# Patient Record
Sex: FEMALE | Race: WHITE | Employment: FULL TIME
[De-identification: names, ages, dates, MRNs, and addresses within clinical notes are randomized per-mention and may not be internally consistent; named-entity substitution may affect disease eponyms.]

---

## 2022-04-18 PROBLEM — E03.9 ACQUIRED HYPOTHYROIDISM: Status: ACTIVE | Noted: 2022-04-18

## 2022-04-18 PROBLEM — F17.200 CURRENT SMOKER: Status: ACTIVE | Noted: 2022-04-18

## 2022-04-18 PROBLEM — E66.9 OBESITY: Status: ACTIVE | Noted: 2022-04-18

## 2022-04-18 PROBLEM — R73.03 PREDIABETES: Status: ACTIVE | Noted: 2022-04-18

## 2022-05-31 ENCOUNTER — NURSE ONLY (OUTPATIENT)
Dept: FAMILY MEDICINE CLINIC | Facility: CLINIC | Age: 37
End: 2022-05-31

## 2022-05-31 DIAGNOSIS — E03.9 ACQUIRED HYPOTHYROIDISM: ICD-10-CM

## 2022-05-31 DIAGNOSIS — E03.9 ACQUIRED HYPOTHYROIDISM: Primary | ICD-10-CM

## 2022-05-31 LAB
T4 FREE SERPL-MCNC: 1 NG/DL (ref 0.9–1.8)
TSH, 3RD GENERATION: 13.3 UIU/ML (ref 0.36–3.74)

## 2022-06-15 ENCOUNTER — OFFICE VISIT (OUTPATIENT)
Dept: FAMILY MEDICINE CLINIC | Facility: CLINIC | Age: 37
End: 2022-06-15
Payer: COMMERCIAL

## 2022-06-15 VITALS
SYSTOLIC BLOOD PRESSURE: 120 MMHG | DIASTOLIC BLOOD PRESSURE: 78 MMHG | WEIGHT: 223 LBS | HEART RATE: 78 BPM | OXYGEN SATURATION: 98 %

## 2022-06-15 DIAGNOSIS — E03.9 ACQUIRED HYPOTHYROIDISM: Primary | ICD-10-CM

## 2022-06-15 DIAGNOSIS — R73.03 PREDIABETES: ICD-10-CM

## 2022-06-15 PROCEDURE — 99213 OFFICE O/P EST LOW 20 MIN: CPT | Performed by: FAMILY MEDICINE

## 2022-06-15 RX ORDER — LEVOTHYROXINE SODIUM 0.07 MG/1
75 TABLET ORAL DAILY
COMMUNITY
Start: 2022-04-18 | End: 2022-06-15 | Stop reason: ALTCHOICE

## 2022-06-15 RX ORDER — LEVOTHYROXINE SODIUM 88 UG/1
88 TABLET ORAL DAILY
Qty: 30 TABLET | Refills: 5 | Status: SHIPPED | OUTPATIENT
Start: 2022-06-15 | End: 2022-07-19

## 2022-06-15 ASSESSMENT — ENCOUNTER SYMPTOMS
CONSTIPATION: 0
COUGH: 0
SHORTNESS OF BREATH: 0
ABDOMINAL PAIN: 0
CHEST TIGHTNESS: 0
EYE DISCHARGE: 0
SINUS PAIN: 0
DIARRHEA: 0

## 2022-06-15 NOTE — PROGRESS NOTES
Edward Ibarra is a 60 [facility] y.o. female who presents with   Chief Complaint   Patient presents with   [facility]       History of Present Illness  Pt with trouble getting pregnant. Was discovered to be hypothyroid in March. Started on Synthroid 75mcg and Metformin. Sxs of weight gain and fatigue as well. FH thyroid disease. Has been able to lose weight, but TSH still up at 13. Amenorrhea since January except some light spotting. Mood low at times. Review of Systems  Review of Systems   Constitutional: Negative for appetite change, fatigue and fever. HENT: Negative for congestion, ear pain and sinus pain. Eyes: Negative for discharge. Respiratory: Negative for cough, chest tightness and shortness of breath. Cardiovascular: Negative for chest pain, palpitations and leg swelling. Gastrointestinal: Negative for abdominal pain, constipation and diarrhea. Genitourinary: Negative for dysuria. Musculoskeletal: Negative for joint swelling. Skin: Negative for rash. Neurological: Negative for headaches. Hematological: Negative for adenopathy. Psychiatric/Behavioral: Negative for dysphoric mood. The patient is not nervous/anxious. Medications  Current Outpatient Medications   Medication Sig Dispense Refill    metFORMIN (GLUCOPHAGE) 500 MG tablet Take 500 mg by mouth 2 times daily      levothyroxine (SYNTHROID) 88 MCG tablet Take 1 tablet by mouth daily 30 tablet 5     No current facility-administered medications for this visit. Past Medical History  History reviewed. No pertinent past medical history.     Surgical History  Past Surgical History:   Procedure Laterality Date    BREAST BIOPSY Left 04/2022    CHOLECYSTECTOMY      HEENT            Family History  Family History   Problem Relation Age of Onset    Thyroid Disease Maternal Aunt     Thyroid Disease Mother     Thyroid Disease Maternal Grandmother     Hypertension Paternal Grandmother        Social History  Social History     Socioeconomic History    Marital status: Single     Spouse name: Not on file    Number of children: Not on file    Years of education: Not on file    Highest education level: Not on file   Occupational History    Not on file   Tobacco Use    Smoking status: Current Every Day Smoker     Packs/day: 0.50    Smokeless tobacco: Never Used   Substance and Sexual Activity    Alcohol use: No    Drug use: No    Sexual activity: Not on file   Other Topics Concern    Not on file   Social History Narrative    Not on file     Social Determinants of Health     Financial Resource Strain:     Difficulty of Paying Living Expenses: Not on file   Food Insecurity:     Worried About Running Out of Food in the Last Year: Not on file    Que of Food in the Last Year: Not on file   Transportation Needs:     Lack of Transportation (Medical): Not on file    Lack of Transportation (Non-Medical):  Not on file   Physical Activity:     Days of Exercise per Week: Not on file    Minutes of Exercise per Session: Not on file   Stress:     Feeling of Stress : Not on file   Social Connections:     Frequency of Communication with Friends and Family: Not on file    Frequency of Social Gatherings with Friends and Family: Not on file    Attends Holiness Services: Not on file    Active Member of [address] [place] [address] [employer] or Organizations: Not on file    Attends Club or Organization Meetings: Not on file    Marital Status: Not on file   Intimate Partner Violence:     Fear of Current or Ex-Partner: Not on file    Emotionally Abused: Not on file    Physically Abused: Not on file    Sexually Abused: Not on file   Housing Stability:     Unable to Pay for Housing in the Last Year: Not on file    Number of [place] in the Last Year: Not on file    Unstable Housing in the Last Year: Not on file       Social History     Tobacco Use   Smoking Status Current Every Day Smoker    Packs/day: 0.50   Smokeless Tobacco Never Used Allergies  No Known Allergies    Vital Signs  There is no height or weight on file to calculate BMI. Vitals:    06/15/22 0759   BP: 120/78   Pulse: 78   SpO2: 98%   Weight: 223 lb (101.2 kg)       Physical Exam  Physical Exam  Vitals reviewed. Constitutional:       Appearance: Normal appearance. HENT:      Head: Normocephalic. Right Ear: Tympanic membrane normal.      Left Ear: Tympanic membrane normal.      Nose: No congestion. Mouth/Throat:      Pharynx: No oropharyngeal exudate or posterior oropharyngeal erythema. Eyes:      Extraocular Movements: Extraocular movements intact. Conjunctiva/sclera: Conjunctivae normal.   Cardiovascular:      Rate and Rhythm: Normal rate and regular rhythm. Heart sounds: Normal heart sounds. No murmur heard. Pulmonary:      Effort: Pulmonary effort is normal.      Breath sounds: Normal breath sounds. No wheezing. Musculoskeletal:         General: No tenderness. Right lower leg: No edema. Left lower leg: No edema. Lymphadenopathy:      Cervical: No cervical adenopathy. Skin:     General: Skin is warm and dry. Findings: No rash. Neurological:      General: No focal deficit present. Mental Status: She is alert. Psychiatric:         Mood and Affect: Mood normal.         Thought Content: Thought content normal.          Assessment and Plan  Nba Thomas was seen today for discuss labs. Diagnoses and all orders for this visit:    Acquired hypothyroidism    Prediabetes    Other orders  -     levothyroxine (SYNTHROID) 88 MCG tablet;  Take 1 tablet by mouth daily    Increase Synthroid  Recheck TSH in 6 weeks  Diet/exercise

## 2022-07-15 DIAGNOSIS — E03.9 ACQUIRED HYPOTHYROIDISM: Primary | ICD-10-CM

## 2022-07-18 ENCOUNTER — NURSE ONLY (OUTPATIENT)
Dept: FAMILY MEDICINE CLINIC | Facility: CLINIC | Age: 37
End: 2022-07-18

## 2022-07-18 DIAGNOSIS — E03.9 ACQUIRED HYPOTHYROIDISM: ICD-10-CM

## 2022-07-18 LAB — TSH, 3RD GENERATION: 8.58 UIU/ML (ref 0.36–3.74)

## 2022-07-19 ENCOUNTER — TELEPHONE (OUTPATIENT)
Dept: FAMILY MEDICINE CLINIC | Facility: CLINIC | Age: 37
End: 2022-07-19

## 2022-07-19 RX ORDER — LEVOTHYROXINE SODIUM 0.1 MG/1
100 TABLET ORAL DAILY
Qty: 30 TABLET | Refills: 5 | Status: SHIPPED | OUTPATIENT
Start: 2022-07-19 | End: 2022-08-30 | Stop reason: SDUPTHER

## 2022-07-19 NOTE — TELEPHONE ENCOUNTER
CALLED [facility] LEFT [facility] AND SEND MY CHART MESSAGE AS PER DR SCHMIDT   Increase Synthroid to 100mcg -sent- and recheck TSH again in 6 weeks.  LR

## 2022-08-26 DIAGNOSIS — E03.9 HYPOTHYROIDISM, UNSPECIFIED TYPE: Primary | ICD-10-CM

## 2022-08-29 ENCOUNTER — NURSE ONLY (OUTPATIENT)
Dept: FAMILY MEDICINE CLINIC | Facility: CLINIC | Age: 37
End: 2022-08-29

## 2022-08-29 DIAGNOSIS — E03.9 HYPOTHYROIDISM, UNSPECIFIED TYPE: ICD-10-CM

## 2022-08-29 LAB — TSH, 3RD GENERATION: 9.99 UIU/ML (ref 0.36–3.74)

## 2022-08-30 RX ORDER — LEVOTHYROXINE SODIUM 112 UG/1
112 TABLET ORAL DAILY
Qty: 30 TABLET | Refills: 6 | Status: SHIPPED | OUTPATIENT
Start: 2022-08-30

## 2022-09-02 ENCOUNTER — TELEPHONE (OUTPATIENT)
Dept: FAMILY MEDICINE CLINIC | Facility: CLINIC | Age: 37
End: 2022-09-02

## 2022-09-06 ENCOUNTER — TELEPHONE (OUTPATIENT)
Dept: FAMILY MEDICINE CLINIC | Facility: CLINIC | Age: 37
End: 2022-09-06

## 2022-09-09 ENCOUNTER — TELEPHONE (OUTPATIENT)
Dept: FAMILY MEDICINE CLINIC | Facility: CLINIC | Age: 37
End: 2022-09-09

## 2022-09-09 NOTE — TELEPHONE ENCOUNTER
ALEX for patient at 1:36 pm  on 9/9 and ask her to call the office if she still needed a lab appt? I see she was recently in on 8/29.

## 2022-10-17 ENCOUNTER — NURSE ONLY (OUTPATIENT)
Dept: FAMILY MEDICINE CLINIC | Facility: CLINIC | Age: 37
End: 2022-10-17

## 2022-10-17 DIAGNOSIS — E03.9 HYPOTHYROIDISM, UNSPECIFIED TYPE: ICD-10-CM

## 2022-10-17 DIAGNOSIS — E03.9 HYPOTHYROIDISM, UNSPECIFIED TYPE: Primary | ICD-10-CM

## 2022-10-17 LAB — TSH, 3RD GENERATION: 1.16 UIU/ML (ref 0.36–3.74)

## 2023-04-17 ENCOUNTER — NURSE ONLY (OUTPATIENT)
Dept: FAMILY MEDICINE CLINIC | Facility: CLINIC | Age: 38
End: 2023-04-17

## 2023-04-17 DIAGNOSIS — E03.9 ACQUIRED HYPOTHYROIDISM: ICD-10-CM

## 2023-04-17 LAB
T4 FREE SERPL-MCNC: 0.8 NG/DL (ref 0.78–1.46)
TSH W FREE THYROID IF ABNORMAL: 8.92 UIU/ML (ref 0.36–3.74)

## 2023-04-18 RX ORDER — LEVOTHYROXINE SODIUM 0.12 MG/1
125 TABLET ORAL DAILY
Qty: 90 TABLET | Refills: 1 | Status: SHIPPED | OUTPATIENT
Start: 2023-04-18

## 2023-05-27 SDOH — ECONOMIC STABILITY: FOOD INSECURITY: WITHIN THE PAST 12 MONTHS, YOU WORRIED THAT YOUR FOOD WOULD RUN OUT BEFORE YOU GOT MONEY TO BUY MORE.: NEVER TRUE

## 2023-05-27 SDOH — ECONOMIC STABILITY: FOOD INSECURITY: WITHIN THE PAST 12 MONTHS, THE FOOD YOU BOUGHT JUST DIDN'T LAST AND YOU DIDN'T HAVE MONEY TO GET MORE.: NEVER TRUE

## 2023-05-27 SDOH — ECONOMIC STABILITY: HOUSING INSECURITY
IN THE LAST 12 MONTHS, WAS THERE A TIME WHEN YOU DID NOT HAVE A STEADY PLACE TO SLEEP OR SLEPT IN A SHELTER (INCLUDING NOW)?: NO

## 2023-05-27 SDOH — ECONOMIC STABILITY: INCOME INSECURITY: HOW HARD IS IT FOR YOU TO PAY FOR THE VERY BASICS LIKE FOOD, HOUSING, MEDICAL CARE, AND HEATING?: NOT HARD AT ALL

## 2023-05-27 SDOH — ECONOMIC STABILITY: TRANSPORTATION INSECURITY
IN THE PAST 12 MONTHS, HAS LACK OF TRANSPORTATION KEPT YOU FROM MEETINGS, WORK, OR FROM GETTING THINGS NEEDED FOR DAILY LIVING?: NO

## 2023-05-30 ENCOUNTER — OFFICE VISIT (OUTPATIENT)
Dept: FAMILY MEDICINE CLINIC | Facility: CLINIC | Age: 38
End: 2023-05-30
Payer: COMMERCIAL

## 2023-05-30 VITALS
SYSTOLIC BLOOD PRESSURE: 126 MMHG | BODY MASS INDEX: 40.7 KG/M2 | DIASTOLIC BLOOD PRESSURE: 76 MMHG | WEIGHT: 221.2 LBS | TEMPERATURE: 97.9 F | OXYGEN SATURATION: 98 % | HEART RATE: 79 BPM | HEIGHT: 62 IN

## 2023-05-30 DIAGNOSIS — N91.2 AMENORRHEA: ICD-10-CM

## 2023-05-30 DIAGNOSIS — E03.9 ACQUIRED HYPOTHYROIDISM: Primary | ICD-10-CM

## 2023-05-30 DIAGNOSIS — E28.2 PCOS (POLYCYSTIC OVARIAN SYNDROME): ICD-10-CM

## 2023-05-30 DIAGNOSIS — L25.9 CONTACT DERMATITIS, UNSPECIFIED CONTACT DERMATITIS TYPE, UNSPECIFIED TRIGGER: ICD-10-CM

## 2023-05-30 DIAGNOSIS — E66.01 OBESITY, CLASS III, BMI 40-49.9 (MORBID OBESITY) (HCC): ICD-10-CM

## 2023-05-30 LAB — HCG SERPL QL: NEGATIVE

## 2023-05-30 PROCEDURE — 99214 OFFICE O/P EST MOD 30 MIN: CPT | Performed by: FAMILY MEDICINE

## 2023-05-30 RX ORDER — TRIAMCINOLONE ACETONIDE 1 MG/G
CREAM TOPICAL
Qty: 80 G | Refills: 1 | Status: SHIPPED | OUTPATIENT
Start: 2023-05-30

## 2023-05-30 ASSESSMENT — ENCOUNTER SYMPTOMS
CONSTIPATION: 0
SHORTNESS OF BREATH: 0
CHEST TIGHTNESS: 0
COUGH: 0
ABDOMINAL PAIN: 0
DIARRHEA: 0
SINUS PAIN: 0
EYE DISCHARGE: 0

## 2023-05-30 ASSESSMENT — PATIENT HEALTH QUESTIONNAIRE - PHQ9
2. FEELING DOWN, DEPRESSED OR HOPELESS: 0
1. LITTLE INTEREST OR PLEASURE IN DOING THINGS: 0
SUM OF ALL RESPONSES TO PHQ QUESTIONS 1-9: 0
SUM OF ALL RESPONSES TO PHQ QUESTIONS 1-9: 0
SUM OF ALL RESPONSES TO PHQ9 QUESTIONS 1 & 2: 0
SUM OF ALL RESPONSES TO PHQ QUESTIONS 1-9: 0
SUM OF ALL RESPONSES TO PHQ QUESTIONS 1-9: 0

## 2023-05-30 NOTE — PROGRESS NOTES
trigger  -     triamcinolone (KENALOG) 0.1 % cream; Apply topically 2 times daily. Obesity, Class III, BMI 40-49.9 (morbid obesity) (HCC)    Derm or allergist if not improved  Await HCG; f/u with OB/GYN  Recheck tsh and adjust if needed      On this date I have spent 30 minutes reviewing previous notes, test results and face to face with the patient discussing the diagnosis and importance of compliance with the treatment plan as well as documenting on the day of the visit.

## 2023-05-31 LAB
T4 FREE SERPL-MCNC: 0.9 NG/DL (ref 0.78–1.46)
TSH W FREE THYROID IF ABNORMAL: 3.78 UIU/ML (ref 0.36–3.74)

## 2023-05-31 RX ORDER — LEVOTHYROXINE SODIUM 137 UG/1
137 TABLET ORAL DAILY
Qty: 90 TABLET | Refills: 1 | Status: SHIPPED | OUTPATIENT
Start: 2023-05-31

## 2023-06-01 DIAGNOSIS — E03.9 ACQUIRED HYPOTHYROIDISM: Primary | ICD-10-CM

## 2023-07-11 ENCOUNTER — NURSE ONLY (OUTPATIENT)
Dept: FAMILY MEDICINE CLINIC | Facility: CLINIC | Age: 38
End: 2023-07-11

## 2023-07-11 DIAGNOSIS — E03.9 ACQUIRED HYPOTHYROIDISM: ICD-10-CM

## 2023-07-11 LAB — TSH W FREE THYROID IF ABNORMAL: 7.16 UIU/ML (ref 0.36–3.74)

## 2023-07-12 DIAGNOSIS — E03.9 ACQUIRED HYPOTHYROIDISM: Primary | ICD-10-CM

## 2023-07-12 LAB — T4 FREE SERPL-MCNC: 1 NG/DL (ref 0.78–1.46)

## 2023-07-12 RX ORDER — LEVOTHYROXINE SODIUM 0.15 MG/1
150 TABLET ORAL DAILY
Qty: 90 TABLET | Refills: 1 | Status: SHIPPED | OUTPATIENT
Start: 2023-07-12

## 2023-08-25 DIAGNOSIS — E03.9 ACQUIRED HYPOTHYROIDISM: ICD-10-CM

## 2023-08-25 LAB
T4 FREE SERPL-MCNC: 1 NG/DL (ref 0.78–1.46)
TSH W FREE THYROID IF ABNORMAL: 10.6 UIU/ML (ref 0.36–3.74)

## 2023-08-28 DIAGNOSIS — E03.9 ACQUIRED HYPOTHYROIDISM: ICD-10-CM

## 2023-08-28 RX ORDER — LEVOTHYROXINE SODIUM 175 UG/1
175 TABLET ORAL DAILY
Qty: 90 TABLET | Refills: 1 | Status: SHIPPED | OUTPATIENT
Start: 2023-08-28

## 2023-08-29 DIAGNOSIS — E03.9 ACQUIRED HYPOTHYROIDISM: Primary | ICD-10-CM

## 2023-10-30 DIAGNOSIS — E03.9 ACQUIRED HYPOTHYROIDISM: ICD-10-CM

## 2023-10-30 LAB — TSH, 3RD GENERATION: 1.17 UIU/ML (ref 0.36–3.74)

## 2024-03-28 DIAGNOSIS — E03.9 ACQUIRED HYPOTHYROIDISM: ICD-10-CM

## 2024-03-28 RX ORDER — LEVOTHYROXINE SODIUM 175 UG/1
175 TABLET ORAL DAILY
Qty: 90 TABLET | Refills: 1 | Status: SHIPPED | OUTPATIENT
Start: 2024-03-28

## 2024-04-29 DIAGNOSIS — E03.9 ACQUIRED HYPOTHYROIDISM: Primary | ICD-10-CM

## 2024-04-29 DIAGNOSIS — E03.9 ACQUIRED HYPOTHYROIDISM: ICD-10-CM

## 2024-04-29 LAB — TSH W FREE THYROID IF ABNORMAL: 1.58 UIU/ML (ref 0.27–4.2)

## 2024-10-18 ENCOUNTER — ANESTHESIA EVENT (OUTPATIENT)
Dept: SURGERY | Age: 39
End: 2024-10-18
Payer: COMMERCIAL

## 2024-10-18 ENCOUNTER — HOSPITAL ENCOUNTER (EMERGENCY)
Age: 39
Discharge: HOME | End: 2024-10-18
Attending: EMERGENCY MEDICINE
Payer: COMMERCIAL

## 2024-10-18 ENCOUNTER — ANESTHESIA (OUTPATIENT)
Dept: SURGERY | Age: 39
End: 2024-10-18
Payer: COMMERCIAL

## 2024-10-18 ENCOUNTER — APPOINTMENT (OUTPATIENT)
Dept: ULTRASOUND IMAGING | Age: 39
End: 2024-10-18
Payer: COMMERCIAL

## 2024-10-18 VITALS
OXYGEN SATURATION: 92 % | TEMPERATURE: 98.1 F | HEART RATE: 94 BPM | RESPIRATION RATE: 16 BRPM | WEIGHT: 230 LBS | SYSTOLIC BLOOD PRESSURE: 119 MMHG | DIASTOLIC BLOOD PRESSURE: 67 MMHG | BODY MASS INDEX: 42.33 KG/M2 | HEIGHT: 62 IN

## 2024-10-18 DIAGNOSIS — K66.1 RUPTURED RIGHT TUBAL ECTOPIC PREGNANCY CAUSING HEMOPERITONEUM: ICD-10-CM

## 2024-10-18 DIAGNOSIS — O00.90 ECTOPIC PREGNANCY WITHOUT INTRAUTERINE PREGNANCY, UNSPECIFIED LOCATION: Primary | ICD-10-CM

## 2024-10-18 DIAGNOSIS — O00.101 RUPTURED RIGHT TUBAL ECTOPIC PREGNANCY CAUSING HEMOPERITONEUM: ICD-10-CM

## 2024-10-18 LAB
ABO + RH BLD: NORMAL
ALBUMIN SERPL-MCNC: 3.2 G/DL (ref 3.5–5)
ALBUMIN/GLOB SERPL: 1 (ref 1–1.9)
ALP SERPL-CCNC: 64 U/L (ref 35–104)
ALT SERPL-CCNC: 11 U/L (ref 8–45)
ANION GAP SERPL CALC-SCNC: 14 MMOL/L (ref 9–18)
APPEARANCE UR: ABNORMAL
AST SERPL-CCNC: 13 U/L (ref 15–37)
BACTERIA URNS QL MICRO: ABNORMAL /HPF
BASOPHILS # BLD: 0.1 K/UL (ref 0–0.2)
BASOPHILS NFR BLD: 0 % (ref 0–2)
BILIRUB SERPL-MCNC: <0.2 MG/DL (ref 0–1.2)
BILIRUB UR QL: NEGATIVE
BLOOD GROUP ANTIBODIES SERPL: NORMAL
BUN SERPL-MCNC: 8 MG/DL (ref 6–23)
CALCIUM SERPL-MCNC: 8.7 MG/DL (ref 8.8–10.2)
CHLORIDE SERPL-SCNC: 105 MMOL/L (ref 98–107)
CO2 SERPL-SCNC: 17 MMOL/L (ref 20–28)
COLOR UR: ABNORMAL
CREAT SERPL-MCNC: 0.53 MG/DL (ref 0.6–1.1)
DIFFERENTIAL METHOD BLD: ABNORMAL
EOSINOPHIL # BLD: 0.1 K/UL (ref 0–0.8)
EOSINOPHIL NFR BLD: 0 % (ref 0.5–7.8)
EPI CELLS #/AREA URNS HPF: ABNORMAL /HPF
ERYTHROCYTE [DISTWIDTH] IN BLOOD BY AUTOMATED COUNT: 13.6 % (ref 11.9–14.6)
GLOBULIN SER CALC-MCNC: 3.1 G/DL (ref 2.3–3.5)
GLUCOSE SERPL-MCNC: 270 MG/DL (ref 70–99)
GLUCOSE UR STRIP.AUTO-MCNC: >1000 MG/DL
HCG SERPL-ACNC: NORMAL MIU/ML
HCG UR QL: POSITIVE
HCT VFR BLD AUTO: 33.9 % (ref 35.8–46.3)
HGB BLD-MCNC: 10.9 G/DL (ref 11.7–15.4)
HGB UR QL STRIP: ABNORMAL
IMM GRANULOCYTES # BLD AUTO: 0.1 K/UL (ref 0–0.5)
IMM GRANULOCYTES NFR BLD AUTO: 1 % (ref 0–5)
KETONES UR QL STRIP.AUTO: 15 MG/DL
LACTATE SERPL-SCNC: 3.9 MMOL/L (ref 0.5–2)
LEUKOCYTE ESTERASE UR QL STRIP.AUTO: NEGATIVE
LIPASE SERPL-CCNC: 22 U/L (ref 13–60)
LYMPHOCYTES # BLD: 2.2 K/UL (ref 0.5–4.6)
LYMPHOCYTES NFR BLD: 12 % (ref 13–44)
MCH RBC QN AUTO: 28.4 PG (ref 26.1–32.9)
MCHC RBC AUTO-ENTMCNC: 32.2 G/DL (ref 31.4–35)
MCV RBC AUTO: 88.3 FL (ref 82–102)
MONOCYTES # BLD: 0.6 K/UL (ref 0.1–1.3)
MONOCYTES NFR BLD: 3 % (ref 4–12)
NEUTS SEG # BLD: 15.3 K/UL (ref 1.7–8.2)
NEUTS SEG NFR BLD: 84 % (ref 43–78)
NITRITE UR QL STRIP.AUTO: NEGATIVE
NRBC # BLD: 0 K/UL (ref 0–0.2)
PH UR STRIP: 5.5 (ref 5–9)
PLATELET # BLD AUTO: 442 K/UL (ref 150–450)
PMV BLD AUTO: 9.3 FL (ref 9.4–12.3)
POTASSIUM SERPL-SCNC: 4 MMOL/L (ref 3.5–5.1)
PROCALCITONIN SERPL-MCNC: 0.03 NG/ML (ref 0–0.1)
PROT SERPL-MCNC: 6.3 G/DL (ref 6.3–8.2)
PROT UR STRIP-MCNC: NEGATIVE MG/DL
RBC # BLD AUTO: 3.84 M/UL (ref 4.05–5.2)
RBC #/AREA URNS HPF: ABNORMAL /HPF
SODIUM SERPL-SCNC: 136 MMOL/L (ref 136–145)
SP GR UR REFRACTOMETRY: 1.03 (ref 1–1.02)
SPECIMEN EXP DATE BLD: NORMAL
UROBILINOGEN UR QL STRIP.AUTO: 0.2 EU/DL (ref 0.2–1)
WBC # BLD AUTO: 18.3 K/UL (ref 4.3–11.1)
WBC URNS QL MICRO: ABNORMAL /HPF

## 2024-10-18 PROCEDURE — 6360000002 HC RX W HCPCS: Performed by: ANESTHESIOLOGY

## 2024-10-18 PROCEDURE — 2500000003 HC RX 250 WO HCPCS: Performed by: OBSTETRICS & GYNECOLOGY

## 2024-10-18 PROCEDURE — 81025 URINE PREGNANCY TEST: CPT

## 2024-10-18 PROCEDURE — 7100000000 HC PACU RECOVERY - FIRST 15 MIN: Performed by: OBSTETRICS & GYNECOLOGY

## 2024-10-18 PROCEDURE — 2580000003 HC RX 258: Performed by: EMERGENCY MEDICINE

## 2024-10-18 PROCEDURE — 81001 URINALYSIS AUTO W/SCOPE: CPT

## 2024-10-18 PROCEDURE — 83605 ASSAY OF LACTIC ACID: CPT

## 2024-10-18 PROCEDURE — 88305 TISSUE EXAM BY PATHOLOGIST: CPT

## 2024-10-18 PROCEDURE — 86900 BLOOD TYPING SEROLOGIC ABO: CPT

## 2024-10-18 PROCEDURE — 3600000004 HC SURGERY LEVEL 4 BASE: Performed by: OBSTETRICS & GYNECOLOGY

## 2024-10-18 PROCEDURE — 2709999900 HC NON-CHARGEABLE SUPPLY: Performed by: OBSTETRICS & GYNECOLOGY

## 2024-10-18 PROCEDURE — 2500000003 HC RX 250 WO HCPCS: Performed by: NURSE ANESTHETIST, CERTIFIED REGISTERED

## 2024-10-18 PROCEDURE — 6360000002 HC RX W HCPCS: Performed by: NURSE ANESTHETIST, CERTIFIED REGISTERED

## 2024-10-18 PROCEDURE — 84145 PROCALCITONIN (PCT): CPT

## 2024-10-18 PROCEDURE — 76801 OB US < 14 WKS SINGLE FETUS: CPT

## 2024-10-18 PROCEDURE — 7100000001 HC PACU RECOVERY - ADDTL 15 MIN: Performed by: OBSTETRICS & GYNECOLOGY

## 2024-10-18 PROCEDURE — 7100000010 HC PHASE II RECOVERY - FIRST 15 MIN: Performed by: OBSTETRICS & GYNECOLOGY

## 2024-10-18 PROCEDURE — 6370000000 HC RX 637 (ALT 250 FOR IP): Performed by: ANESTHESIOLOGY

## 2024-10-18 PROCEDURE — 83690 ASSAY OF LIPASE: CPT

## 2024-10-18 PROCEDURE — 76705 ECHO EXAM OF ABDOMEN: CPT

## 2024-10-18 PROCEDURE — 99284 EMERGENCY DEPT VISIT MOD MDM: CPT

## 2024-10-18 PROCEDURE — 86901 BLOOD TYPING SEROLOGIC RH(D): CPT

## 2024-10-18 PROCEDURE — 3700000000 HC ANESTHESIA ATTENDED CARE: Performed by: OBSTETRICS & GYNECOLOGY

## 2024-10-18 PROCEDURE — 80053 COMPREHEN METABOLIC PANEL: CPT

## 2024-10-18 PROCEDURE — 2580000003 HC RX 258: Performed by: NURSE ANESTHETIST, CERTIFIED REGISTERED

## 2024-10-18 PROCEDURE — 2720000010 HC SURG SUPPLY STERILE: Performed by: OBSTETRICS & GYNECOLOGY

## 2024-10-18 PROCEDURE — 84702 CHORIONIC GONADOTROPIN TEST: CPT

## 2024-10-18 PROCEDURE — 3700000001 HC ADD 15 MINUTES (ANESTHESIA): Performed by: OBSTETRICS & GYNECOLOGY

## 2024-10-18 PROCEDURE — 7100000011 HC PHASE II RECOVERY - ADDTL 15 MIN: Performed by: OBSTETRICS & GYNECOLOGY

## 2024-10-18 PROCEDURE — 3600000014 HC SURGERY LEVEL 4 ADDTL 15MIN: Performed by: OBSTETRICS & GYNECOLOGY

## 2024-10-18 PROCEDURE — 86850 RBC ANTIBODY SCREEN: CPT

## 2024-10-18 PROCEDURE — 85025 COMPLETE CBC W/AUTO DIFF WBC: CPT

## 2024-10-18 PROCEDURE — 87040 BLOOD CULTURE FOR BACTERIA: CPT

## 2024-10-18 RX ORDER — SODIUM CHLORIDE 0.9 % (FLUSH) 0.9 %
5-40 SYRINGE (ML) INJECTION EVERY 12 HOURS SCHEDULED
Status: CANCELLED | OUTPATIENT
Start: 2024-10-18

## 2024-10-18 RX ORDER — PROPOFOL 10 MG/ML
INJECTION, EMULSION INTRAVENOUS
Status: DISCONTINUED | OUTPATIENT
Start: 2024-10-18 | End: 2024-10-18 | Stop reason: SDUPTHER

## 2024-10-18 RX ORDER — LABETALOL HYDROCHLORIDE 5 MG/ML
10 INJECTION, SOLUTION INTRAVENOUS
Status: DISCONTINUED | OUTPATIENT
Start: 2024-10-18 | End: 2024-10-18 | Stop reason: HOSPADM

## 2024-10-18 RX ORDER — SODIUM CHLORIDE, SODIUM LACTATE, POTASSIUM CHLORIDE, CALCIUM CHLORIDE 600; 310; 30; 20 MG/100ML; MG/100ML; MG/100ML; MG/100ML
INJECTION, SOLUTION INTRAVENOUS CONTINUOUS
Status: CANCELLED | OUTPATIENT
Start: 2024-10-18

## 2024-10-18 RX ORDER — ROCURONIUM BROMIDE 10 MG/ML
INJECTION, SOLUTION INTRAVENOUS
Status: DISCONTINUED | OUTPATIENT
Start: 2024-10-18 | End: 2024-10-18 | Stop reason: SDUPTHER

## 2024-10-18 RX ORDER — SODIUM CHLORIDE 9 MG/ML
INJECTION, SOLUTION INTRAVENOUS PRN
Status: CANCELLED | OUTPATIENT
Start: 2024-10-18

## 2024-10-18 RX ORDER — DEXAMETHASONE SODIUM PHOSPHATE 10 MG/ML
INJECTION INTRAMUSCULAR; INTRAVENOUS
Status: DISCONTINUED | OUTPATIENT
Start: 2024-10-18 | End: 2024-10-18 | Stop reason: SDUPTHER

## 2024-10-18 RX ORDER — OXYCODONE HYDROCHLORIDE 5 MG/1
5 TABLET ORAL
Status: COMPLETED | OUTPATIENT
Start: 2024-10-18 | End: 2024-10-18

## 2024-10-18 RX ORDER — ONDANSETRON 2 MG/ML
4 INJECTION INTRAMUSCULAR; INTRAVENOUS
Status: DISCONTINUED | OUTPATIENT
Start: 2024-10-18 | End: 2024-10-18 | Stop reason: HOSPADM

## 2024-10-18 RX ORDER — SODIUM CHLORIDE 0.9 % (FLUSH) 0.9 %
5-40 SYRINGE (ML) INJECTION PRN
Status: CANCELLED | OUTPATIENT
Start: 2024-10-18

## 2024-10-18 RX ORDER — 0.9 % SODIUM CHLORIDE 0.9 %
1000 INTRAVENOUS SOLUTION INTRAVENOUS ONCE
Status: COMPLETED | OUTPATIENT
Start: 2024-10-18 | End: 2024-10-18

## 2024-10-18 RX ORDER — SUCCINYLCHOLINE CHLORIDE 20 MG/ML
INJECTION INTRAMUSCULAR; INTRAVENOUS
Status: DISCONTINUED | OUTPATIENT
Start: 2024-10-18 | End: 2024-10-18 | Stop reason: SDUPTHER

## 2024-10-18 RX ORDER — BUPIVACAINE HYDROCHLORIDE AND EPINEPHRINE 2.5; 5 MG/ML; UG/ML
INJECTION, SOLUTION EPIDURAL; INFILTRATION; INTRACAUDAL; PERINEURAL PRN
Status: DISCONTINUED | OUTPATIENT
Start: 2024-10-18 | End: 2024-10-18 | Stop reason: ALTCHOICE

## 2024-10-18 RX ORDER — ONDANSETRON 2 MG/ML
INJECTION INTRAMUSCULAR; INTRAVENOUS
Status: DISCONTINUED | OUTPATIENT
Start: 2024-10-18 | End: 2024-10-18 | Stop reason: SDUPTHER

## 2024-10-18 RX ORDER — FENTANYL CITRATE 50 UG/ML
INJECTION, SOLUTION INTRAMUSCULAR; INTRAVENOUS
Status: DISCONTINUED | OUTPATIENT
Start: 2024-10-18 | End: 2024-10-18 | Stop reason: SDUPTHER

## 2024-10-18 RX ORDER — SODIUM CHLORIDE, SODIUM LACTATE, POTASSIUM CHLORIDE, CALCIUM CHLORIDE 600; 310; 30; 20 MG/100ML; MG/100ML; MG/100ML; MG/100ML
INJECTION, SOLUTION INTRAVENOUS
Status: DISCONTINUED | OUTPATIENT
Start: 2024-10-18 | End: 2024-10-18 | Stop reason: SDUPTHER

## 2024-10-18 RX ORDER — MIDAZOLAM HYDROCHLORIDE 1 MG/ML
INJECTION INTRAMUSCULAR; INTRAVENOUS
Status: DISCONTINUED | OUTPATIENT
Start: 2024-10-18 | End: 2024-10-18 | Stop reason: SDUPTHER

## 2024-10-18 RX ORDER — HYDRALAZINE HYDROCHLORIDE 20 MG/ML
10 INJECTION INTRAMUSCULAR; INTRAVENOUS
Status: DISCONTINUED | OUTPATIENT
Start: 2024-10-18 | End: 2024-10-18 | Stop reason: HOSPADM

## 2024-10-18 RX ORDER — ACETAMINOPHEN 500 MG
1000 TABLET ORAL ONCE
Status: CANCELLED | OUTPATIENT
Start: 2024-10-18 | End: 2024-10-18

## 2024-10-18 RX ORDER — OXYCODONE AND ACETAMINOPHEN 5; 325 MG/1; MG/1
1 TABLET ORAL EVERY 6 HOURS PRN
Qty: 12 TABLET | Refills: 0 | Status: SHIPPED | OUTPATIENT
Start: 2024-10-18 | End: 2024-10-21

## 2024-10-18 RX ORDER — OXYCODONE HYDROCHLORIDE 5 MG/1
5 TABLET ORAL EVERY 4 HOURS PRN
Status: CANCELLED | OUTPATIENT
Start: 2024-10-18

## 2024-10-18 RX ORDER — LIDOCAINE HYDROCHLORIDE 20 MG/ML
INJECTION, SOLUTION EPIDURAL; INFILTRATION; INTRACAUDAL; PERINEURAL
Status: DISCONTINUED | OUTPATIENT
Start: 2024-10-18 | End: 2024-10-18 | Stop reason: SDUPTHER

## 2024-10-18 RX ORDER — NALOXONE HYDROCHLORIDE 0.4 MG/ML
INJECTION, SOLUTION INTRAMUSCULAR; INTRAVENOUS; SUBCUTANEOUS PRN
Status: DISCONTINUED | OUTPATIENT
Start: 2024-10-18 | End: 2024-10-18 | Stop reason: HOSPADM

## 2024-10-18 RX ORDER — KETOROLAC TROMETHAMINE 30 MG/ML
INJECTION, SOLUTION INTRAMUSCULAR; INTRAVENOUS
Status: DISCONTINUED | OUTPATIENT
Start: 2024-10-18 | End: 2024-10-18 | Stop reason: SDUPTHER

## 2024-10-18 RX ORDER — ONDANSETRON 2 MG/ML
4 INJECTION INTRAMUSCULAR; INTRAVENOUS EVERY 6 HOURS PRN
Status: CANCELLED | OUTPATIENT
Start: 2024-10-18

## 2024-10-18 RX ORDER — ACETAMINOPHEN 500 MG
1000 TABLET ORAL EVERY 8 HOURS PRN
Status: CANCELLED | OUTPATIENT
Start: 2024-10-18

## 2024-10-18 RX ORDER — PROCHLORPERAZINE EDISYLATE 5 MG/ML
5 INJECTION INTRAMUSCULAR; INTRAVENOUS
Status: DISCONTINUED | OUTPATIENT
Start: 2024-10-18 | End: 2024-10-18 | Stop reason: HOSPADM

## 2024-10-18 RX ORDER — ONDANSETRON 4 MG/1
4 TABLET, ORALLY DISINTEGRATING ORAL EVERY 8 HOURS PRN
Status: CANCELLED | OUTPATIENT
Start: 2024-10-18

## 2024-10-18 RX ORDER — IBUPROFEN 600 MG/1
600 TABLET, FILM COATED ORAL 3 TIMES DAILY PRN
Qty: 30 TABLET | Refills: 0 | Status: SHIPPED | OUTPATIENT
Start: 2024-10-18

## 2024-10-18 RX ADMIN — DEXAMETHASONE SODIUM PHOSPHATE 10 MG: 10 INJECTION INTRAMUSCULAR; INTRAVENOUS at 07:26

## 2024-10-18 RX ADMIN — LIDOCAINE HYDROCHLORIDE 100 MG: 20 INJECTION, SOLUTION EPIDURAL; INFILTRATION; INTRACAUDAL; PERINEURAL at 07:17

## 2024-10-18 RX ADMIN — FENTANYL CITRATE 100 MCG: 50 INJECTION, SOLUTION INTRAMUSCULAR; INTRAVENOUS at 07:16

## 2024-10-18 RX ADMIN — OXYCODONE HYDROCHLORIDE 5 MG: 5 TABLET ORAL at 09:19

## 2024-10-18 RX ADMIN — MIDAZOLAM 2 MG: 1 INJECTION INTRAMUSCULAR; INTRAVENOUS at 07:11

## 2024-10-18 RX ADMIN — Medication 200 MG: at 07:17

## 2024-10-18 RX ADMIN — ONDANSETRON 8 MG: 2 INJECTION INTRAMUSCULAR; INTRAVENOUS at 07:26

## 2024-10-18 RX ADMIN — ROCURONIUM BROMIDE 40 MG: 10 INJECTION, SOLUTION INTRAVENOUS at 07:20

## 2024-10-18 RX ADMIN — ROCURONIUM BROMIDE 10 MG: 10 INJECTION, SOLUTION INTRAVENOUS at 07:17

## 2024-10-18 RX ADMIN — PROPOFOL 200 MG: 10 INJECTION, EMULSION INTRAVENOUS at 07:17

## 2024-10-18 RX ADMIN — SODIUM CHLORIDE 1000 ML: 900 INJECTION, SOLUTION INTRAVENOUS at 06:08

## 2024-10-18 RX ADMIN — SUGAMMADEX 200 MG: 100 INJECTION, SOLUTION INTRAVENOUS at 08:15

## 2024-10-18 RX ADMIN — KETOROLAC TROMETHAMINE 30 MG: 30 INJECTION, SOLUTION INTRAMUSCULAR; INTRAVENOUS at 08:15

## 2024-10-18 RX ADMIN — HYDROMORPHONE HYDROCHLORIDE 0.5 MG: 1 INJECTION, SOLUTION INTRAMUSCULAR; INTRAVENOUS; SUBCUTANEOUS at 08:47

## 2024-10-18 RX ADMIN — SODIUM CHLORIDE, SODIUM LACTATE, POTASSIUM CHLORIDE, AND CALCIUM CHLORIDE: 600; 310; 30; 20 INJECTION, SOLUTION INTRAVENOUS at 07:06

## 2024-10-18 ASSESSMENT — PAIN DESCRIPTION - DESCRIPTORS: DESCRIPTORS: SORE

## 2024-10-18 ASSESSMENT — LIFESTYLE VARIABLES: SMOKING_STATUS: 1

## 2024-10-18 ASSESSMENT — PAIN DESCRIPTION - LOCATION: LOCATION: ABDOMEN

## 2024-10-18 ASSESSMENT — PAIN SCALES - GENERAL
PAINLEVEL_OUTOF10: 5
PAINLEVEL_OUTOF10: 6
PAINLEVEL_OUTOF10: 5
PAINLEVEL_OUTOF10: 9

## 2024-10-18 ASSESSMENT — PAIN DESCRIPTION - ORIENTATION: ORIENTATION: ANTERIOR

## 2024-10-18 ASSESSMENT — PAIN - FUNCTIONAL ASSESSMENT: PAIN_FUNCTIONAL_ASSESSMENT: 0-10

## 2024-10-18 ASSESSMENT — PAIN DESCRIPTION - PAIN TYPE: TYPE: SURGICAL PAIN

## 2024-10-18 NOTE — PROGRESS NOTES
This patient needs to be off work for one week starting from today 10/18/2024 due to medical reasons.    Irene Ponce M.D.

## 2024-10-18 NOTE — H&P
Department of Gynecology  Attending Pre-operative History and Physical        DIAGNOSIS:  ruptured ectopic pregnancy    INDICATION:  ruptured ectopic pregnancy (suspect right adenexa)    PROCEDURE:  diagnostic laparoscope with removal ectopic pregnancy to include removal fallopian tube and blood transfusion if indicated    CHIEF COMPLAINT:   abdominal pain    Reason for Admission:  ruptured ectopic pregnancy    History obtained from patient    HISTORY OF PRESENT ILLNESS:                     The patient is a 38 y.o.  female with significant past medical history of  positive pregnancy who presents with abdominal pain. Hx pcos with irreg menstrual cycles. Pt thinks she is about 9 weeks pregnant but cycles are irregular. BHCG is 10,000. Ultrasound consistent with ruptured ectopic (suspect right) with hemaperitoneum.       Past Medical History:        Diagnosis Date    Anxiety     Depression     Diabetes mellitus (HCC)     Headache     Hyperthyroidism     Thyroid disease      Past Surgical History:        Procedure Laterality Date    BREAST BIOPSY Left 2022    CHOLECYSTECTOMY      HEENT         Past Gynecological History:    1. Last menstrual period: irreg cycles 2024  2. Menses: irreg  3. Contraception: None/ intended pregnancy  4. Sexually transmitted disease history: none        A. Number of sexual partners in the last 6 months: 1    5. Pap History: Last PAP was normal; .           6. Date of last mammogram: abnormal mammogram  with benign breast biopsy    OB History   No obstetric history on file.   One pregnancy 15 years ago    Medications Prior to Admission:   Not in a hospital admission.    Allergies:  Penicillins     Social History:    employed    Family History:       Problem Relation Age of Onset    Thyroid Disease Mother     Alcohol Abuse Mother     No Known Problems Brother     Thyroid Disease Maternal Aunt     Thyroid Disease Maternal Grandmother     Hypertension Paternal

## 2024-10-18 NOTE — ANESTHESIA PRE PROCEDURE
• Smoking status: Every Day     Current packs/day: 0.50     Average packs/day: 0.5 packs/day for 21.8 years (10.9 ttl pk-yrs)     Types: Cigarettes     Start date: 2003   • Smokeless tobacco: Never   Substance Use Topics   • Alcohol use: Not Currently     Comment: rare                                Ready to quit: Not Answered  Counseling given: Not Answered      Vital Signs (Current):   Vitals:    10/18/24 0208 10/18/24 0545   BP: (!) 143/76 (!) 166/93   Pulse: (!) 105    Resp: 18    Temp: 98.6 °F (37 °C)    TempSrc: Oral    SpO2: 98% 99%   Weight: 104.3 kg (230 lb)    Height: 1.575 m (5' 2\")                                               BP Readings from Last 3 Encounters:   10/18/24 (!) 166/93   05/30/23 126/76   04/11/23 127/77       NPO Status:                                                                                 BMI:   Wt Readings from Last 3 Encounters:   10/18/24 104.3 kg (230 lb)   05/30/23 100.3 kg (221 lb 3.2 oz)   04/11/23 100.7 kg (222 lb)     Body mass index is 42.07 kg/m².    CBC:   Lab Results   Component Value Date/Time    WBC 18.3 10/18/2024 02:38 AM    RBC 3.84 10/18/2024 02:38 AM    HGB 10.9 10/18/2024 02:38 AM    HCT 33.9 10/18/2024 02:38 AM    MCV 88.3 10/18/2024 02:38 AM    RDW 13.6 10/18/2024 02:38 AM     10/18/2024 02:38 AM       CMP:   Lab Results   Component Value Date/Time     10/18/2024 02:38 AM    K 4.0 10/18/2024 02:38 AM     10/18/2024 02:38 AM    CO2 17 10/18/2024 02:38 AM    BUN 8 10/18/2024 02:38 AM    CREATININE 0.53 10/18/2024 02:38 AM    LABGLOM >90 10/18/2024 02:38 AM    LABGLOM >60 04/09/2023 06:38 AM    GLUCOSE 270 10/18/2024 02:38 AM    CALCIUM 8.7 10/18/2024 02:38 AM    BILITOT <0.2 10/18/2024 02:38 AM    ALKPHOS 64 10/18/2024 02:38 AM    AST 13 10/18/2024 02:38 AM    ALT 11 10/18/2024 02:38 AM       POC Tests: No results for input(s): \"POCGLU\", \"POCNA\", \"POCK\", \"POCCL\", \"POCBUN\", \"POCHEMO\", \"POCHCT\" in the last 72 hours.    Coags: No results

## 2024-10-18 NOTE — ED PROVIDER NOTES
Emergency Department Provider Note       PCP: Kimberly Burton MD   Age: 38 y.o.   Sex: female     DISPOSITION Send To Or 10/18/2024 05:43:15 AM  Condition at Disposition: Data Unavailable       ICD-10-CM    1. Ectopic pregnancy without intrauterine pregnancy, unspecified location  O00.90           Medical Decision Making     Patient comes to the ED for evaluation of upper abdominal pain that radiates to her back.  Patient states she is currently 9 weeks pregnant.  Patient states her pain is worse when she takes a deep breath.  G3, P2.  Patient denies vaginal bleeding.  Rh+.  Patient with history of cholecystectomy.Pt with tachycardia (105), stable BP.  She is afebrile.     CBC with WBC of 18.  Stable anemia. Lactic Acid 3.9. Quantitative HCG is 10,400.  UA without UTI. Blood Cultures obtained. CMP unremarkable.  Given pt may have a surgical abdomen/pelvis, decision made to initiate IVF.  (Current national shortage).  Pt started on Cefepime. US of abdomen and pelvis ordered.     0503: US completed.  Do not see IUP.  Free fluid seen on imaging.  Will call Radiologist to expedite read.  Patient remains stable with stable vital signs.  Will obtain type and screen.    0508: Pt updated.  She has not been seen by an OBGYN for this pregnancy.  States she has an appointment with [facility]'s OBGYN on 10/28/24.  Prelim report of R sided ectopic pregnancy.  Will page OB hospitalist.  Will hold abx at this time and continue with IVF.    0518: Spoke with OB hospitalist, she will be down to evaluate.     0532:US reported:  IMPRESSION:  1.  There is no intrauterine pregnancy.  Differential diagnosis  includes early pregnancy, aborted pregnancy, and occult ectopic  pregnancy.  Beta hCG correlation and follow-up is recommended.  2.  There is a simple 2 cm right ovarian cyst.  3.  There is a 3.7 x 3.5 x 3.4 cm complex structure adjacent the right  ovary.  This demonstrates rim hyperemia and central    Physical Exam  Vitals and nursing note reviewed.   Constitutional:       General: She is not in acute distress.     Appearance: She is well-developed. She is obese. She is not ill-appearing, toxic-appearing or diaphoretic.   HENT:      Head: Normocephalic and atraumatic.      Mouth/Throat:      Mouth: Mucous membranes are moist.   Eyes:      Extraocular Movements: Extraocular movements intact.   Cardiovascular:      Rate and Rhythm: Normal rate.   Pulmonary:      Effort: Pulmonary effort is normal. No respiratory distress.      Breath sounds: Normal breath sounds.   Abdominal:      General: There is no distension.      Palpations: Abdomen is soft.      Tenderness: There is abdominal tenderness in the right upper quadrant, epigastric area and left upper quadrant.   Skin:     General: Skin is warm and dry.      Capillary Refill: Capillary refill takes less than 2 seconds.   Neurological:      General: No focal deficit present.      Mental Status: She is alert.   Psychiatric:         Mood and Affect: Mood normal.        Procedures     Procedures    Orders Placed This Encounter   Procedures    Blood Culture 2    Blood Culture 1    US TRANSABDOMINAL TRANSVAGINAL LESS THAN 14 WEEKS    US ABDOMEN LIMITED Specify organ? APPENDIX, LIVER, PANCREAS    CBC with Auto Differential    CMP    Urinalysis    Urine Preg (Lab)    HCG, Quantitative, Serum    Lipase    Procalcitonin    Lactate, Sepsis    Diet NPO    TYPE AND SCREEN        Medications given during this emergency department visit:  Medications   ceFEPIme (MAXIPIME) 2,000 mg in sterile water 20 mL IV syringe (has no administration in time range)   sodium chloride 0.9 % bolus 1,000 mL (has no administration in time range)       New Prescriptions    No medications on file        Past Medical History:   Diagnosis Date    Anxiety     Depression     Diabetes mellitus (HCC)     Headache     Hyperthyroidism     Thyroid disease         Past Surgical History:   Procedure

## 2024-10-18 NOTE — DISCHARGE INSTRUCTIONS
INSTRUCTIONS FOLLOWING GYN LAPAROSCOPY    ACTIVITY   Limit activity today; increase activity tomorrow, but no vigorous exercise   Shower only; no tub baths   No douches, tampons or intercourse until your doctor releases you (at least 2 weeks)   May return to work or school as directed by your doctor    DIET   Clear liquids until no nausea or vomiting   Advance to regular diet as tolerated    PAIN   Expect a moderate amount of pain.   Take pain medication as directed by your doctor. If no prescription for pain is sent home with you, take the appropriate dose of your commonly used pain medication.   Call you doctor if pain is NOT relieved by medication.   DO NOT take aspirin or blood thinners until directed by your doctor.    You will probably have bloody discharge (like a period) for 1-2 days, then watery discharge for another 7 days.    You will want to use a perineal pad, not tampons for this.      DRESSING CARE   Change dressing / band aids as directed by your doctor. You will experience bleeding similar to a period for the next couple of days followed by watery discharge up to seven more days.    FOLLOW PHONE CALLS  * Calls will be made by nursing staff.   If you have any problems or concerns, call your doctor as needed.    CALL YOUR DOCTOR IF   Excessive bleeding that does not stop after holding mild pressure over the area for 15 minutes   You soak a pad in an hour   Temperature of 101°F or above   Green or yellow, smelly drainage or discharge   You are unable to urinate by bedtime   Nausea and vomiting that does not stop by bedtime    AFTER ANESTHESIA   For the next 24 hours: DO NOT Drive, Drink alcoholic beverages, or Make important decisions.   Be aware of dizziness following anesthesia and while taking pain medication.   Plan to stay tonight within 1 hour's drive of the hospital.

## 2024-10-18 NOTE — ANESTHESIA PROCEDURE NOTES
Airway  Date/Time: 10/18/2024 7:18 AM  Urgency: elective    Airway not difficult    General Information and Staff    Patient location during procedure: OR  Anesthesiologist: Reyes Fragoso DO  Resident/CRNA: Romero Mandel APRN - CRNA  Performed: resident/CRNA   Performed by: Romero Mandel APRN - CRNA  Authorized by: Reyes Fragoso DO      Indications and Patient Condition  Indications for airway management: anesthesia and airway protection  Spontaneous Ventilation: absent  Sedation level: deep  Preoxygenated: yes  Patient position: sniffing  MILS not maintained throughout  Mask difficulty assessment: not attempted    Final Airway Details  Final airway type: endotracheal airway      Successful airway: ETT  Cuffed: yes   Successful intubation technique: direct laryngoscopy  Facilitating devices/methods: intubating stylet  Endotracheal tube insertion site: oral  Blade: Warner  Blade size: #2  ETT size (mm): 7.0  Cormack-Lehane Classification: grade I - full view of glottis  Placement verified by: chest auscultation and capnometry   Measured from: teeth  ETT to teeth (cm): 21  Number of attempts at approach: 1  Ventilation between attempts: bag mask  Number of other approaches attempted: 0    no

## 2024-10-18 NOTE — OP NOTE
Operative Note      Patient: Cherelle Vyas  YOB: 1985  MRN: [id number]    Date of Procedure: 10/18/2024    Pre-Op Diagnosis Codes:      * Ruptured right tubal ectopic pregnancy causing hemoperitoneum [O00.101, K66.1]    Post-Op Diagnosis: Same       Procedure(s):  LAPAROSCOPY DIAGNOSTIC  SALPINGECTOMY LAPAROSCOPIC; DRAINED RIGHT OVARIAN CYST    Surgeon(s):  Dennis Bolanos MD    Assistant:   * No surgical staff found *    Anesthesia: General    Estimated Blood Loss (mL): 400 (in pelvis on arrival- minimal blood loss with surgery)    Complications: None    Specimens:   ID Type Source Tests Collected by Time Destination   A : Right fallopian tube Tissue Fallopian Tube SURGICAL PATHOLOGY Dennis Bolanos MD 10/18/2024 0745            Drains:   [REMOVED] Urinary Catheter 10/18/24 2 Way;Montes (Removed)     Specimen: right ectopic/ fallopian tube    Findings:  Infection Present At Time Of Surgery (PATOS) (choose all levels that have infection present):  No infection present  Other Findings: enlarged right fallopian tube c/w ectopic  Adhesions around right tube and ovary and filmy adhesions around left tube  Right ovarian cyst- drained for clear fluid    Detailed Description of Procedure:   After informed consent, pt taken to OR and given gen anestheisa  Prepped and draped in usual sterile fashion  Montes and hulka clamp placed  5 mm incision infra-umbilically . Visiport used for direct port placement. Co2 gas instilled. 5 mm port placed rlq and 12 mm port llq. Ectopic identified. Right fallopian tube with ectopic removed with ligasure. Large right ovarian cyst- drained for clear fluid. Abd irrigated to remove blood present at time of surgery.   Inlet port closure device used to close left lower port site with 0 vicryl.  All port sites closed with 4.0 monocryl and injected with 0.25 marciane with epi  Hulka clamp and montes removed.  To PACU in good condition    Electronically signed by  Dennis Bolanos MD on 10/18/2024 at 8:52 AM

## 2024-10-18 NOTE — PERIOP NOTE
Discharge instructions reviewed with pt and family member who verbalize understanding of follow up care.  Family at bedside

## 2024-10-18 NOTE — ANESTHESIA POSTPROCEDURE EVALUATION
Department of Anesthesiology  Postprocedure Note    Patient: Cherelle Vyas  MRN: [id number]  YOB: 1985  Date of evaluation: 10/18/2024    Procedure Summary       Date: 10/18/24 Room / [place] [facility] [place] [address] [place] [address] MAIN OR    Anesthesia Start: 0706 Anesthesia Stop: 0832    Procedures:       LAPAROSCOPY DIAGNOSTIC (Bilateral: Pelvis)      SALPINGECTOMY LAPAROSCOPIC; DRAINED RIGHT OVARIAN CYST (Right: Abdomen) Diagnosis:       Ruptured right tubal ectopic pregnancy causing hemoperitoneum      (Ruptured right tubal ectopic pregnancy causing hemoperitoneum [O00.101, K66.1])    Surgeons: Dennis Bolanos MD Responsible Provider: Reyes Fragoso DO    Anesthesia Type: general ASA Status: 3 - Emergent            Anesthesia Type: No value filed.    Mechelle Phase I: Mechelle Score: 10    Mechelle Phase II: Mechelle Score: 10    Anesthesia Post Evaluation    Patient location during evaluation: PACU  Level of consciousness: awake and alert  Airway patency: patent  Nausea & Vomiting: no nausea  Cardiovascular status: hemodynamically stable  Respiratory status: acceptable  Hydration status: euvolemic  Pain management: satisfactory to patient    No notable events documented.

## 2024-10-18 NOTE — PERIOP NOTE
Call placed to [facility] [place] regarding follow up visit for patient . GILMA stauffer to follow up with patient at discharge for a follow up visit. Office unable to provide work note prior to initial patient visit

## 2024-10-18 NOTE — ED TRIAGE NOTES
Patient reports upper abdominal pain. Patient endorses being 9 weeks pregnant. Patient denies N/V/D.

## 2024-10-20 LAB
BACTERIA SPEC CULT: NORMAL
BACTERIA SPEC CULT: NORMAL
SERVICE CMNT-IMP: NORMAL
SERVICE CMNT-IMP: NORMAL

## 2024-10-22 ENCOUNTER — OFFICE VISIT (OUTPATIENT)
Dept: OBGYN CLINIC | Age: 39
End: 2024-10-22
Payer: COMMERCIAL

## 2024-10-22 VITALS
HEIGHT: 62 IN | WEIGHT: 230 LBS | BODY MASS INDEX: 42.33 KG/M2 | SYSTOLIC BLOOD PRESSURE: 120 MMHG | DIASTOLIC BLOOD PRESSURE: 70 MMHG

## 2024-10-22 DIAGNOSIS — O00.101 RIGHT TUBAL PREGNANCY WITHOUT INTRAUTERINE PREGNANCY: ICD-10-CM

## 2024-10-22 DIAGNOSIS — Z09 POSTOP CHECK: Primary | ICD-10-CM

## 2024-10-22 PROCEDURE — 99203 OFFICE O/P NEW LOW 30 MIN: CPT | Performed by: OBSTETRICS & GYNECOLOGY

## 2024-10-22 NOTE — PROGRESS NOTES
CC:  Postop follow-up      HPI:  Cherelle presents for postop visit from [facility] [place] salpingectomy performed by OB (hospitalist group) due to patient presenting to ER without a GYN. Today is POD#4. Doing well. Pain well controlled.     Op findings: Rupturing right tubal ectopic   Pathology:  Not resulted     Patient doing well postop without significant complaints. Voiding without difficulty.  Tolerating regular diet w/out nausea/vomiting; +flatus and bms.  Pain controlled with Ibuprofen only.  Started her period. Ambulating well.  No issues today.       Would like to go back to work at 2 weeks with lifting restrictions.       PE:  /70   Ht 1.575 m (5' 2\")   Wt 104.3 kg (230 lb)   BMI 42.07 kg/m²       Constitutional: She appears well-developed and well-nourished. No distress.    HENT:    Head: Normocephalic and atraumatic.    Cardiovascular: RRR  Pulmonary/Chest: CTAB  Abd: S/NTTP/ND, BS normoactive, Incision c/d/i , but there is circumferential bruising around each port site.   Ext: No c/c/e    A/P:  Postoperative state:   Stable Post op condition    Op notes and HP reviewed from her hospital admission   Note given to return to work with lifting restrictions until 2 weeks postop  Will need appt for annual and to establish care    Billed as E/M as surgery was performed by hospital group ([facility] They do not offer office FU so she was seen by me today     Patt Floyd,

## 2024-11-11 DIAGNOSIS — E03.9 ACQUIRED HYPOTHYROIDISM: ICD-10-CM

## 2024-11-11 RX ORDER — LEVOTHYROXINE SODIUM 175 UG/1
175 TABLET ORAL DAILY
Qty: 90 TABLET | Refills: 0 | Status: SHIPPED | OUTPATIENT
Start: 2024-11-11

## 2025-01-09 SDOH — ECONOMIC STABILITY: INCOME INSECURITY: IN THE LAST 12 MONTHS, WAS THERE A TIME WHEN YOU WERE NOT ABLE TO PAY THE MORTGAGE OR RENT ON TIME?: NO

## 2025-01-09 SDOH — ECONOMIC STABILITY: FOOD INSECURITY: WITHIN THE PAST 12 MONTHS, YOU WORRIED THAT YOUR FOOD WOULD RUN OUT BEFORE YOU GOT MONEY TO BUY MORE.: NEVER TRUE

## 2025-01-09 SDOH — ECONOMIC STABILITY: TRANSPORTATION INSECURITY
IN THE PAST 12 MONTHS, HAS THE LACK OF TRANSPORTATION KEPT YOU FROM MEDICAL APPOINTMENTS OR FROM GETTING MEDICATIONS?: NO

## 2025-01-09 SDOH — ECONOMIC STABILITY: FOOD INSECURITY: WITHIN THE PAST 12 MONTHS, THE FOOD YOU BOUGHT JUST DIDN'T LAST AND YOU DIDN'T HAVE MONEY TO GET MORE.: NEVER TRUE

## 2025-01-09 ASSESSMENT — PATIENT HEALTH QUESTIONNAIRE - PHQ9
1. LITTLE INTEREST OR PLEASURE IN DOING THINGS: SEVERAL DAYS
SUM OF ALL RESPONSES TO PHQ9 QUESTIONS 1 & 2: 2
2. FEELING DOWN, DEPRESSED OR HOPELESS: SEVERAL DAYS
SUM OF ALL RESPONSES TO PHQ9 QUESTIONS 1 & 2: 2
SUM OF ALL RESPONSES TO PHQ QUESTIONS 1-9: 2
2. FEELING DOWN, DEPRESSED OR HOPELESS: SEVERAL DAYS
SUM OF ALL RESPONSES TO PHQ QUESTIONS 1-9: 2
1. LITTLE INTEREST OR PLEASURE IN DOING THINGS: SEVERAL DAYS

## 2025-01-10 ENCOUNTER — OFFICE VISIT (OUTPATIENT)
Dept: FAMILY MEDICINE CLINIC | Facility: CLINIC | Age: 40
End: 2025-01-10

## 2025-01-10 VITALS
OXYGEN SATURATION: 98 % | BODY MASS INDEX: 43.6 KG/M2 | WEIGHT: 238.4 LBS | TEMPERATURE: 97.6 F | HEART RATE: 78 BPM | SYSTOLIC BLOOD PRESSURE: 114 MMHG | DIASTOLIC BLOOD PRESSURE: 70 MMHG

## 2025-01-10 DIAGNOSIS — E28.2 PCOS (POLYCYSTIC OVARIAN SYNDROME): ICD-10-CM

## 2025-01-10 DIAGNOSIS — E03.9 ACQUIRED HYPOTHYROIDISM: Primary | ICD-10-CM

## 2025-01-10 DIAGNOSIS — Z87.59 HISTORY OF ECTOPIC PREGNANCY: ICD-10-CM

## 2025-01-10 DIAGNOSIS — R73.03 PREDIABETES: ICD-10-CM

## 2025-01-10 LAB
HBA1C MFR BLD: 6.2 %
T4 FREE SERPL-MCNC: 1.1 NG/DL (ref 0.9–1.7)
TSH W FREE THYROID IF ABNORMAL: 10.5 UIU/ML (ref 0.27–4.2)

## 2025-01-10 RX ORDER — METFORMIN HYDROCHLORIDE 500 MG/1
500 TABLET, EXTENDED RELEASE ORAL
Qty: 90 TABLET | Refills: 3 | Status: SHIPPED | OUTPATIENT
Start: 2025-01-10

## 2025-01-10 RX ORDER — LEVOTHYROXINE SODIUM 175 UG/1
175 TABLET ORAL DAILY
Qty: 90 TABLET | Refills: 3 | Status: SHIPPED | OUTPATIENT
Start: 2025-01-10 | End: 2025-01-11 | Stop reason: SDUPTHER

## 2025-01-10 SDOH — ECONOMIC STABILITY: FOOD INSECURITY: WITHIN THE PAST 12 MONTHS, YOU WORRIED THAT YOUR FOOD WOULD RUN OUT BEFORE YOU GOT MONEY TO BUY MORE.: NEVER TRUE

## 2025-01-10 SDOH — ECONOMIC STABILITY: FOOD INSECURITY: WITHIN THE PAST 12 MONTHS, THE FOOD YOU BOUGHT JUST DIDN'T LAST AND YOU DIDN'T HAVE MONEY TO GET MORE.: NEVER TRUE

## 2025-01-10 ASSESSMENT — ENCOUNTER SYMPTOMS
SINUS PAIN: 0
DIARRHEA: 0
COUGH: 0
CHEST TIGHTNESS: 0
CONSTIPATION: 0
SHORTNESS OF BREATH: 0
ABDOMINAL PAIN: 0
EYE DISCHARGE: 0

## 2025-01-10 NOTE — PROGRESS NOTES
Cherelle Vyas is a 39 y.o. female who presents with   Chief Complaint   Patient presents with    Hypothyroidism    Lab Collection       History of Present Illness    Pt with ectopic pregnancy 10/18/24.  Here for recheck of thyroid.  No temperature intolerance.  No palpitations.  No skin or hair changes.  No increased fatigue.  No jitteriness.  Mood good. Taking Xolair for recurrent hives.  Has been effective.  Hx of prediabetes with last A1C 2022 of 6.3.  BS was 270 at time of ectopic.  Had been on Metformin for PCOS in past but off now.    Review of Systems  Review of Systems   Constitutional:  Negative for appetite change, fatigue and fever.   HENT:  Negative for congestion, ear pain and sinus pain.    Eyes:  Negative for discharge.   Respiratory:  Negative for cough, chest tightness and shortness of breath.    Cardiovascular:  Negative for chest pain, palpitations and leg swelling.   Gastrointestinal:  Negative for abdominal pain, constipation and diarrhea.   Genitourinary:  Negative for dysuria.   Musculoskeletal:  Negative for joint swelling.   Skin:  Negative for rash.   Neurological:  Negative for headaches.   Hematological:  Negative for adenopathy.   Psychiatric/Behavioral:  Negative for dysphoric mood. The patient is not nervous/anxious.         Medications  Current Outpatient Medications   Medication Sig Dispense Refill    levothyroxine (SYNTHROID) 175 MCG tablet Take 1 tablet by mouth daily 90 tablet 3    metFORMIN (GLUCOPHAGE-XR) 500 MG extended release tablet Take 1 tablet by mouth daily (with breakfast) 90 tablet 3    ibuprofen (ADVIL;MOTRIN) 600 MG tablet Take 1 tablet by mouth 3 times daily as needed for Pain 30 tablet 0    triamcinolone (KENALOG) 0.1 % cream Apply topically 2 times daily. 80 g 1     No current facility-administered medications for this visit.        Past Medical History  Past Medical History:   Diagnosis Date    Anxiety     Depression     Diabetes mellitus (HCC)     Ectopic

## 2025-01-11 DIAGNOSIS — E03.9 ACQUIRED HYPOTHYROIDISM: ICD-10-CM

## 2025-01-11 RX ORDER — LEVOTHYROXINE SODIUM 200 UG/1
200 TABLET ORAL DAILY
Qty: 90 TABLET | Refills: 3 | Status: SHIPPED | OUTPATIENT
Start: 2025-01-11

## 2025-02-18 ENCOUNTER — LAB (OUTPATIENT)
Dept: FAMILY MEDICINE CLINIC | Facility: CLINIC | Age: 40
End: 2025-02-18

## 2025-02-18 DIAGNOSIS — E03.9 ACQUIRED HYPOTHYROIDISM: Primary | ICD-10-CM

## 2025-02-18 DIAGNOSIS — E03.9 ACQUIRED HYPOTHYROIDISM: ICD-10-CM

## 2025-02-18 LAB — TSH W FREE THYROID IF ABNORMAL: 0.91 UIU/ML (ref 0.27–4.2)

## 2025-04-15 ENCOUNTER — OFFICE VISIT (OUTPATIENT)
Dept: OBGYN CLINIC | Age: 40
End: 2025-04-15
Payer: COMMERCIAL

## 2025-04-15 VITALS
BODY MASS INDEX: 41.96 KG/M2 | DIASTOLIC BLOOD PRESSURE: 84 MMHG | WEIGHT: 228 LBS | SYSTOLIC BLOOD PRESSURE: 124 MMHG | HEIGHT: 62 IN

## 2025-04-15 DIAGNOSIS — Z01.419 WELL WOMAN EXAM WITH ROUTINE GYNECOLOGICAL EXAM: Primary | ICD-10-CM

## 2025-04-15 DIAGNOSIS — Z11.51 SCREENING FOR HPV (HUMAN PAPILLOMAVIRUS): ICD-10-CM

## 2025-04-15 DIAGNOSIS — Z12.4 PAP SMEAR FOR CERVICAL CANCER SCREENING: ICD-10-CM

## 2025-04-15 DIAGNOSIS — Z31.69 INFERTILITY COUNSELING: ICD-10-CM

## 2025-04-15 PROCEDURE — 99395 PREV VISIT EST AGE 18-39: CPT | Performed by: OBSTETRICS & GYNECOLOGY

## 2025-04-15 PROCEDURE — 99459 PELVIC EXAMINATION: CPT | Performed by: OBSTETRICS & GYNECOLOGY

## 2025-04-15 NOTE — PROGRESS NOTES
CC:  Annual GYN exam    HPI:  39 y.o.  No obstetric history on file. presents today for a routine gynecological examination.  Patient's last menstrual period was 2025..    Pt additionally wants to discuss inability to conceive.   Hx of  x 1. Has been trying 10 + years with . Hx of PCOS. No formal recent workup  + smoker   S/p right salpingectomy for ectopic pregnancy last year by OB hospitalist     Contraception:  None      Menses:  Regular    Sexually active w/   No changes in sexual partners --declines STD testing      Ob hx:      GYN HISTORY:  As per HPI     Last Pap:  DUE  Hx of Abnl Paps: Denies    Hx STDs: Denies     OB History    No obstetric history on file.           Past Medical History:   Diagnosis Date    Anxiety     Depression     Diabetes mellitus (HCC)     Ectopic pregnancy 10/18/2024    Headache     Hyperthyroidism     Migraine     Thyroid disease          Past Surgical History:   Procedure Laterality Date    BREAST BIOPSY Left 2022    CHOLECYSTECTOMY      HEENT      LAPAROSCOPY Bilateral 10/18/2024    [place]OS[place]AG[place] [facility] [place]    SALPINGECTOMY Right 10/18/2024    SALPINGECTOMY LAPAROSCOPIC; DRAINED RIGHT OVARIAN CYST [place] [facility] [place]         Outpatient Encounter Medications as of 4/15/2025   Medication Sig Dispense Refill    omalizumab (XOLAIR) 150 MG injection Inject into the skin every 28 days      levothyroxine (SYNTHROID) 200 MCG tablet Take 1 tablet by mouth daily 90 tablet 3    metFORMIN (GLUCOPHAGE-XR) 500 MG extended release tablet Take 1 tablet by mouth daily (with breakfast) 90 tablet 3    ibuprofen (ADVIL;MOTRIN) 600 MG tablet Take 1 tablet by mouth 3 times daily as needed for Pain 30 tablet 0    triamcinolone (KENALOG) 0.1 % cream Apply topically 2 times daily. 80 g 1     No facility-administered encounter medications on file as of 4/15/2025.         Allergies   Allergen

## 2025-04-17 LAB
COLLECTION METHOD: NORMAL
CYTOLOGIST CVX/VAG CYTO: NORMAL
CYTOLOGY CVX/VAG DOC THIN PREP: NORMAL
DATE OF LMP: NORMAL
HPV APTIMA: NEGATIVE
HPV GENOTYPE REFLEX: NORMAL
Lab: NORMAL
PAP SOURCE: NORMAL
PATH REPORT.FINAL DX SPEC: NORMAL
STAT OF ADQ CVX/VAG CYTO-IMP: NORMAL

## 2025-04-21 ENCOUNTER — RESULTS FOLLOW-UP (OUTPATIENT)
Dept: OBGYN CLINIC | Age: 40
End: 2025-04-21

## 2025-07-10 ENCOUNTER — OFFICE VISIT (OUTPATIENT)
Dept: FAMILY MEDICINE CLINIC | Facility: CLINIC | Age: 40
End: 2025-07-10
Payer: COMMERCIAL

## 2025-07-10 VITALS
HEART RATE: 72 BPM | DIASTOLIC BLOOD PRESSURE: 78 MMHG | TEMPERATURE: 97.5 F | OXYGEN SATURATION: 100 % | WEIGHT: 225 LBS | BODY MASS INDEX: 41.15 KG/M2 | SYSTOLIC BLOOD PRESSURE: 120 MMHG

## 2025-07-10 DIAGNOSIS — E03.9 ACQUIRED HYPOTHYROIDISM: ICD-10-CM

## 2025-07-10 DIAGNOSIS — R73.03 PREDIABETES: Primary | ICD-10-CM

## 2025-07-10 DIAGNOSIS — E28.2 PCOS (POLYCYSTIC OVARIAN SYNDROME): ICD-10-CM

## 2025-07-10 LAB
HBA1C MFR BLD: 6 %
TSH W FREE THYROID IF ABNORMAL: 2.23 UIU/ML (ref 0.27–4.2)

## 2025-07-10 PROCEDURE — 99213 OFFICE O/P EST LOW 20 MIN: CPT | Performed by: FAMILY MEDICINE

## 2025-07-10 PROCEDURE — 83036 HEMOGLOBIN GLYCOSYLATED A1C: CPT | Performed by: FAMILY MEDICINE

## 2025-07-10 ASSESSMENT — ENCOUNTER SYMPTOMS
ABDOMINAL PAIN: 0
DIARRHEA: 0
SINUS PAIN: 0
SHORTNESS OF BREATH: 0
COUGH: 0
CHEST TIGHTNESS: 0
CONSTIPATION: 0
EYE DISCHARGE: 0

## 2025-07-10 NOTE — PROGRESS NOTES
Cherelle Vyas is a 39 y.o. female who presents with   Chief Complaint   Patient presents with    Hypothyroidism    Polycystic Ovarian Syndrome     And prediabetes. Not consistent with metformin d/t GI s/e.       History of Present Illness    Here for recheck of thyroid.  No temperature intolerance.  No palpitations.  No skin or hair changes.  No increased fatigue.  No jitteriness.  Mood good. General body aches. Hx of PCOS/Pre-diabetes.  Inconsistent with Metformin due to GI sxs.  Periods more regular.      Review of Systems  Review of Systems   Constitutional:  Negative for appetite change, fatigue and fever.   HENT:  Negative for congestion, ear pain and sinus pain.    Eyes:  Negative for discharge.   Respiratory:  Negative for cough, chest tightness and shortness of breath.    Cardiovascular:  Negative for chest pain, palpitations and leg swelling.   Gastrointestinal:  Negative for abdominal pain, constipation and diarrhea.   Genitourinary:  Negative for dysuria.   Musculoskeletal:  Negative for joint swelling.   Skin:  Negative for rash.   Neurological:  Negative for headaches.   Hematological:  Negative for adenopathy.   Psychiatric/Behavioral:  Negative for dysphoric mood. The patient is not nervous/anxious.         Medications  Current Outpatient Medications   Medication Sig Dispense Refill    omalizumab (XOLAIR) 150 MG injection Inject into the skin every 28 days      levothyroxine (SYNTHROID) 200 MCG tablet Take 1 tablet by mouth daily 90 tablet 3    metFORMIN (GLUCOPHAGE-XR) 500 MG extended release tablet Take 1 tablet by mouth daily (with breakfast) 90 tablet 3    ibuprofen (ADVIL;MOTRIN) 600 MG tablet Take 1 tablet by mouth 3 times daily as needed for Pain 30 tablet 0    triamcinolone (KENALOG) 0.1 % cream Apply topically 2 times daily. 80 g 1     No current facility-administered medications for this visit.        Past Medical History  Past Medical History:   Diagnosis Date    Anxiety     Depression

## 2025-08-22 ENCOUNTER — OFFICE VISIT (OUTPATIENT)
Dept: FAMILY MEDICINE CLINIC | Facility: CLINIC | Age: 40
End: 2025-08-22
Payer: COMMERCIAL

## 2025-08-22 VITALS
WEIGHT: 225 LBS | RESPIRATION RATE: 16 BRPM | TEMPERATURE: 97.2 F | DIASTOLIC BLOOD PRESSURE: 80 MMHG | OXYGEN SATURATION: 99 % | SYSTOLIC BLOOD PRESSURE: 130 MMHG | HEART RATE: 73 BPM | BODY MASS INDEX: 41.41 KG/M2 | HEIGHT: 62 IN

## 2025-08-22 DIAGNOSIS — M54.9 MID BACK PAIN: Primary | ICD-10-CM

## 2025-08-22 DIAGNOSIS — E66.813 CLASS 3 SEVERE OBESITY DUE TO EXCESS CALORIES WITH SERIOUS COMORBIDITY AND BODY MASS INDEX (BMI) OF 40.0 TO 44.9 IN ADULT (HCC): ICD-10-CM

## 2025-08-22 DIAGNOSIS — E28.2 PCOS (POLYCYSTIC OVARIAN SYNDROME): ICD-10-CM

## 2025-08-22 DIAGNOSIS — M62.838 MUSCLE SPASM: ICD-10-CM

## 2025-08-22 DIAGNOSIS — R73.03 PREDIABETES: ICD-10-CM

## 2025-08-22 LAB
BILIRUBIN, URINE, POC: NEGATIVE
BLOOD URINE, POC: ABNORMAL
GLUCOSE URINE, POC: NEGATIVE
KETONES, URINE, POC: NEGATIVE
LEUKOCYTE ESTERASE, URINE, POC: NEGATIVE
NITRITE, URINE, POC: NEGATIVE
PH, URINE, POC: 6 (ref 4.6–8)
PROTEIN,URINE, POC: NEGATIVE
SPECIFIC GRAVITY, URINE, POC: 1.01 (ref 1–1.03)
URINALYSIS CLARITY, POC: CLEAR
URINALYSIS COLOR, POC: ABNORMAL
UROBILINOGEN, POC: NORMAL

## 2025-08-22 PROCEDURE — 81003 URINALYSIS AUTO W/O SCOPE: CPT

## 2025-08-22 PROCEDURE — 99214 OFFICE O/P EST MOD 30 MIN: CPT

## 2025-08-22 RX ORDER — TIZANIDINE 2 MG/1
2-4 TABLET ORAL 3 TIMES DAILY PRN
Qty: 30 TABLET | Refills: 0 | Status: SHIPPED | OUTPATIENT
Start: 2025-08-22 | End: 2025-08-29

## 2025-08-22 ASSESSMENT — PATIENT HEALTH QUESTIONNAIRE - PHQ9
1. LITTLE INTEREST OR PLEASURE IN DOING THINGS: SEVERAL DAYS
SUM OF ALL RESPONSES TO PHQ QUESTIONS 1-9: 2
2. FEELING DOWN, DEPRESSED OR HOPELESS: SEVERAL DAYS

## 2025-08-22 ASSESSMENT — ENCOUNTER SYMPTOMS: BACK PAIN: 1

## (undated) DEVICE — TUBING INSUFFLATION SMK EVAC HI FLO SET PNEUMOCLEAR

## (undated) DEVICE — APPLICATOR MEDICATED 26 CC SOLUTION HI LT ORNG CHLORAPREP

## (undated) DEVICE — GARMENT,MEDLINE,DVT,INT,CALF,MED, GEN2: Brand: MEDLINE

## (undated) DEVICE — GYN LAPAROSCOPY: Brand: MEDLINE INDUSTRIES, INC.

## (undated) DEVICE — TROCAR: Brand: KII FIOS FIRST ENTRY

## (undated) DEVICE — Device

## (undated) DEVICE — INJECTOR UTERINE MANIPULATOR

## (undated) DEVICE — SEALER ENDOSCP L37CM NANO COAT BLNT TIP LAP DIV

## (undated) DEVICE — SOLUTION IRRIG 1000ML 0.9% SOD CHL USP POUR PLAS BTL

## (undated) DEVICE — BAG SPEC REM 224ML W4XL6IN DIA10MM 1 HND GYN DISP ENDOPCH

## (undated) DEVICE — PAD PT POS 36 IN SURGYPAD DISP